# Patient Record
Sex: MALE | URBAN - METROPOLITAN AREA
[De-identification: names, ages, dates, MRNs, and addresses within clinical notes are randomized per-mention and may not be internally consistent; named-entity substitution may affect disease eponyms.]

---

## 2024-11-15 ENCOUNTER — OFFICE VISIT (OUTPATIENT)
Dept: URGENT CARE | Age: 44
End: 2024-11-15
Payer: COMMERCIAL

## 2024-11-15 VITALS
TEMPERATURE: 97.2 F | DIASTOLIC BLOOD PRESSURE: 93 MMHG | OXYGEN SATURATION: 99 % | SYSTOLIC BLOOD PRESSURE: 163 MMHG | WEIGHT: 250 LBS | HEART RATE: 91 BPM | RESPIRATION RATE: 16 BRPM

## 2024-11-15 DIAGNOSIS — J02.0 STREP PHARYNGITIS: Primary | ICD-10-CM

## 2024-11-15 DIAGNOSIS — B34.8 RHINOVIRUS: ICD-10-CM

## 2024-11-15 DIAGNOSIS — J35.8 TONSILLITH: ICD-10-CM

## 2024-11-15 LAB
POC HUMAN RHINOVIRUS PCR: POSITIVE
POC INFLUENZA A VIRUS PCR: NEGATIVE
POC INFLUENZA B VIRUS PCR: NEGATIVE
POC RESPIRATORY SYNCYTIAL VIRUS PCR: NEGATIVE
POC STREPTOCOCCUS PYOGENES (GROUP A STREP) PCR: POSITIVE

## 2024-11-15 RX ORDER — AMOXICILLIN 500 MG/1
500 CAPSULE ORAL 2 TIMES DAILY
Qty: 20 CAPSULE | Refills: 0 | Status: SHIPPED | OUTPATIENT
Start: 2024-11-15 | End: 2024-11-25

## 2024-11-15 NOTE — PROGRESS NOTES
Subjective   Patient ID: Aly Smith is a 44 y.o. male. They present today with a chief complaint of Sore Throat (X 2 1/2 weeks, dry cough).    Patient disposition: Home    HISTORY OF PRESENT ILLNESS:    [facility] adult presents for ST. Had fever and malaise and mild cough with ST last week. Most sx resolved but still has ST on L side and FB sensation, mild. Denies any current cough, CP, HA, dyspnea, flulike sx.        Past Medical History  Allergies as of 11/15/2024    (No Known Allergies)       (Not in a hospital admission)       No past medical history on file.    No past surgical history on file.     reports that he has never smoked. He has never used smokeless tobacco.    Review of Systems    Negative except as documented in the History of Present Illness.                             Objective    Vitals:    11/15/24 0916   BP: (!) 163/93   Pulse: 91   Resp: 16   Temp: 36.2 °C (97.2 °F)   SpO2: 99%   Weight: 113 kg (250 lb)     No LMP for male patient.      PHYSICAL EXAMINATION:    CONSTITUTIONAL: well-appearing, nontoxic         ENT:  Head and face are unremarkable and atraumatic. Mucous membranes moist.    * Small L tonsillith visible with mild erythema surrounding. No exudate, no ulcer present.    * No uvular deviation. No visible abscess.    * Lymphadenopathy absent.    * TMs nl bl.         LUNGS:  CTAB, no r/r/w.    CARDIOVASCULAR:   RRR, no m/r/g. Nl S1/S2.    ABDOMEN:  Nontender including left upper quadrant, nondistended, no acute abdomen.     MUSCULOSKELETAL: No obvious deformities. DUARTE with equal strength. Gait normal.    SKIN:   Warm and dry with no rashes.    NEURO:  Normal baseline mental status.    PSYCH: Appropriate mood and affect.         ------------------------------------------         MDM: Tonsillith identified and discussed with pt. Strep+ and rhinovirus also+. Amox Rx provided and will fu here PRN.        Procedures    Diagnostic study results (if any) were reviewed by Cesar Velasquez  PUMA.    No results found for this visit on 11/15/24.     Assessment/Plan   Allergies, medications, history, and pertinent labs/EKGs/Imaging reviewed by Cesar Velasquez PA-C.     Orders and Diagnoses  There are no diagnoses linked to this encounter.    Medical Admin Record      Follow Up Instructions  No follow-ups on file.    Electronically signed by Cesar Velasquez PA-C  9:34 AM